# Patient Record
Sex: FEMALE | URBAN - METROPOLITAN AREA
[De-identification: names, ages, dates, MRNs, and addresses within clinical notes are randomized per-mention and may not be internally consistent; named-entity substitution may affect disease eponyms.]

---

## 2019-04-22 ENCOUNTER — OFFICE VISIT (OUTPATIENT)
Dept: ORTHOPEDIC SURGERY | Facility: CLINIC | Age: 39
End: 2019-04-22

## 2019-04-22 VITALS
HEART RATE: 83 BPM | DIASTOLIC BLOOD PRESSURE: 87 MMHG | HEIGHT: 67 IN | RESPIRATION RATE: 18 BRPM | TEMPERATURE: 98.2 F | BODY MASS INDEX: 37.42 KG/M2 | SYSTOLIC BLOOD PRESSURE: 131 MMHG | OXYGEN SATURATION: 99 % | WEIGHT: 238.4 LBS

## 2019-04-22 DIAGNOSIS — M75.02 ADHESIVE CAPSULITIS OF LEFT SHOULDER: ICD-10-CM

## 2019-04-22 DIAGNOSIS — M75.32 CALCIFIC TENDINITIS OF LEFT SHOULDER: ICD-10-CM

## 2019-04-22 DIAGNOSIS — S43.402A SPRAIN OF LEFT SHOULDER, UNSPECIFIED SHOULDER SPRAIN TYPE, INITIAL ENCOUNTER: Primary | ICD-10-CM

## 2019-04-22 PROBLEM — E66.01 SEVERE OBESITY (HCC): Status: ACTIVE | Noted: 2019-04-22

## 2019-04-22 RX ORDER — DESOGESTREL/ETHINYL ESTRADIOL AND ETHINYL ESTRADIOL 21-5 (28)
KIT ORAL
Refills: 0 | COMMUNITY
Start: 2019-04-16

## 2019-04-22 RX ORDER — BETAMETHASONE SODIUM PHOSPHATE AND BETAMETHASONE ACETATE 3; 3 MG/ML; MG/ML
6 INJECTION, SUSPENSION INTRA-ARTICULAR; INTRALESIONAL; INTRAMUSCULAR; SOFT TISSUE ONCE
Qty: 0.5 ML | Refills: 0
Start: 2019-04-22 | End: 2019-04-22

## 2019-04-22 NOTE — PATIENT INSTRUCTIONS
Rotator Cuff Injury: Care Instructions Your Care Instructions The rotator cuff is a group of tendons and muscles around the shoulder that keeps the upper arm bone in place. It keeps the shoulder joint stable and allows you to raise and rotate your arm. Damage to the rotator cuff can be caused by overuse, a fall, or a direct blow to the shoulder area, which can tear the tendons. Over time, everyday wear can damage the tendons and make injury more likely. Treatment can depend upon the amount of damage to the tendons. In a younger person, surgery may be the first choice. But if you are older than 25, you likely have some wear on your rotator cuff. Surgery may not be the most effective treatment. Your doctor may have you try physical therapy and exercise first. 
Follow-up care is a key part of your treatment and safety. Be sure to make and go to all appointments, and call your doctor if you are having problems. It's also a good idea to know your test results and keep a list of the medicines you take. How can you care for yourself at home? · Rest your shoulder as much as you can. If your doctor put your arm in a sling or shoulder immobilizer, wear it as directed. Do not take it off before your doctor tells you to. If it is too tight, loosen it. · Be safe with medicines. Read and follow all instructions on the label. ? If the doctor gave you a prescription medicine for pain, take it as prescribed. ? If you are not taking a prescription pain medicine, ask your doctor if you can take an over-the-counter medicine. · Put ice or a cold pack on your shoulder for 10 to 20 minutes at a time. Try to do this every 1 to 2 hours for the next 3 days (when you are awake). Put a thin cloth between the ice pack and your skin. · After 3 days, put a warm, wet towel on your shoulder. This is to relax the muscles and help blood flow.  
· While holding a warm, wet towel on your shoulder, lean forward so your arm hangs freely, and gently swing your arm back and forth like a pendulum. You also can do this standing under a warm shower. · Do not do anything that makes your pain worse. · Follow your doctor's advice about whether you need physical therapy. When should you call for help? Call your doctor now or seek immediate medical care if: 
  · You have severe pain.  
  · You cannot move your shoulder or arm.  
  · You have tingling or numbness in your arm or hand.  
  · Your arm or hand is cool or pale.  
 Watch closely for changes in your health, and be sure to contact your doctor if: 
  · Your pain gets worse.  
  · You have new or worse swelling in your arm or hand.  
  · You do not get better as expected. Where can you learn more? Go to http://shiva-luana.info/. Enter 473 [id number] in the search box to learn more about \"Rotator Cuff Injury: Care Instructions. \" Current as of: September 20, 2018 Content Version: 11.9 © 1719-0876 [employer]. Care instructions adapted under license by [employer] (which disclaims liability or warranty for this information). If you have questions about a medical condition or this instruction, always ask your healthcare professional. [place] [address] any warranty or liability for your use of this information. Shoulder Sprain: Care Instructions Your Care Instructions A shoulder sprain occurs when you stretch or tear a ligament in your shoulder. Ligaments are tough tissues that connect one bone to another. A sprain can happen during sports, a fall, or projects around the house. Shoulder sprains usually get better with treatment at home. Follow-up care is a key part of your treatment and safety. Be sure to make and go to all appointments, and call your doctor if you are having problems. It's also a good idea to know your test results and keep a list of the medicines you take. How can you care for yourself at home? · Rest and protect your shoulder. Try to stop or reduce any action that causes pain. · If your doctor gave you a sling or immobilizer, wear it as directed. A sling or immobilizer supports your shoulder and may make you more comfortable. · Put ice or a cold pack on your shoulder for 10 to 20 minutes at a time. Try to do this every 1 to 2 hours for the next 3 days (when you are awake) or until the swelling goes down. Put a thin cloth between the ice and your skin. Some doctors suggest alternating between hot and cold. · Be safe with medicines. Read and follow all instructions on the label. ? If the doctor gave you a prescription medicine for pain, take it as prescribed. ? If you are not taking a prescription pain medicine, ask your doctor if you can take an over-the-counter medicine. · For the first day or two after an injury, avoid things that might increase swelling, such as hot showers, hot tubs, or hot packs. · After 2 or 3 days, if your swelling is gone, apply a heating pad set on low or a warm cloth to your shoulder. This helps keep your shoulder flexible. Some doctors suggest that you go back and forth between hot and cold. Put a thin cloth between the heating pad and your skin. · Follow your doctor's or physical therapist's directions for exercises. · Return to your usual level of activity slowly. When should you call for help? Call your doctor now or seek immediate medical care if: 
  · Your pain is worse.  
  · You cannot move your shoulder.  
  · Your arm is cool or pale or changes color below the shoulder.  
  · You have tingling, weakness, or numbness in your arm.  
 Watch closely for changes in your health, and be sure to contact your doctor if: 
  · You do not get better as expected. Where can you learn more? Go to http://shiva-luana.info/. Enter F614 in the search box to learn more about \"Shoulder Sprain: Care Instructions. \" Current as of: September 20, 2018 Content Version: 11.9 © 9694-0604 [employer]. Care instructions adapted under license by [employer] (which disclaims liability or warranty for this information). If you have questions about a medical condition or this instruction, always ask your healthcare professional. [address] any warranty or liability for your use of this information. Frozen Shoulder: Care Instructions Your Care Instructions Frozen shoulder is stiffness, pain, and trouble moving your shoulder. It may happen after an injury or overuse, or from a disease such as diabetes or a stroke. You may have pain that keeps you from using your shoulder. However, you need to move your shoulder. If you do not move it, it will get more stiff and sore. Your doctor may order an X-ray to make sure there is not another cause for your stiff shoulder. You can treat frozen shoulder with heat, stretching, over-the-counter pain medicines, and physical therapy. Your doctor also may inject medicine into your shoulder to reduce pain and swelling. It can take a year or more to get better. Surgery is almost never needed. Follow-up care is a key part of your treatment and safety. Be sure to make and go to all appointments, and call your doctor if you are having problems. It's also a good idea to know your test results and keep a list of the medicines you take. How can you care for yourself at home? · Take pain medicines exactly as directed. ? If the doctor gave you a prescription medicine for pain, take it as prescribed. ? If you are not taking a prescription pain medicine, ask your doctor if you can take an over-the-counter medicine. · Put a heating pad set on low or a warm, wet towel wrapped in plastic on your shoulder. The heat may make it easier to stretch your shoulder. · Follow your doctor's advice for stretches and exercises. · Go to physical therapy if your doctor suggests it. · Try these stretching exercises to reduce stiffness if your doctor says it is okay. Do the exercises slowly to avoid injury. Put a warm, wet towel on your shoulder before exercising. Stop any exercise that increases pain. ? Pendulum exercise. While leaning forward and holding onto a table or the back of a chair with your good arm, bend at the waist, allowing your affected arm to hang straight down. Swing the affected arm back and forth like a pendulum, then in circles that start small and gradually grow larger as pain allows. Try this for about 2 or 3 minutes, several times a day. Once pain begins to go away, you can do this exercise while holding a 1- or 2-pound weight. ? Wall climbing (to the side). Stand with your side to a wall so that your fingers can just touch it. Then turn so your body is turned slightly toward the wall. Walk the fingers of your injured arm up the wall as high as pain permits. Hold that position for a count of 15 to 30 seconds. Walk your fingers down to the starting position. Repeat 2 to 4 times, trying to reach higher each time. ? Wall climbing (to the front). Face a wall, standing so your fingers can just touch it. Walk the fingers of your affected arm up the wall as high as pain permits. Hold that position for a count of 15 to 30 seconds. Slowly walk your fingers to the starting position. Repeat 2 to 4 times, trying to reach higher each time. When should you call for help? Call your doctor now or seek immediate medical care if: 
  · You have severe pain.  
  · Your arm is cool or pale or changes color.  
  · You have tingling or numbness in your arm.  
 Watch closely for changes in your health, and be sure to contact your doctor if: 
  · You have increased pain.  
  · You have new pain that develops in another area. For example, you have pain in your arm, hand, or elbow.  
  · You do not get better as expected. Where can you learn more? Go to http://shiva-luana.info/. Enter R621 in the search box to learn more about \"Frozen Shoulder: Care Instructions. \" Current as of: September 20, 2018 Content Version: 11.9 © 6166-6187 [employer]. Care instructions adapted under license by [employer] (which disclaims liability or warranty for this information). If you have questions about a medical condition or this instruction, always ask your healthcare professional. [address] any warranty or liability for your use of this information. Frozen Shoulder: Exercises Your Care Instructions Here are some examples of typical rehabilitation exercises for your condition. Start each exercise slowly. Ease off the exercise if you start to have pain. Your doctor or physical therapist will tell you when you can start these exercises and which ones will work best for you. How to do the exercises Neck stretches 1. Look straight ahead, and tip your right ear to your right shoulder. Do not let your left shoulder rise up as you tip your head to the right. 2. Hold 15 to 30 seconds. 3. Tilt your head to the left. Do not let your right shoulder rise up as you tip your head to the left. 4. Hold for 15 to 30 seconds. 5. Repeat 2 to 4 times to each side. Shoulder rolls 1. Sit comfortably with your feet shoulder-width apart. You can also do this exercise while standing. 2. Roll your shoulders up, then back, and then down in a smooth, circular motion. 3. Repeat 2 to 4 times. Shoulder flexion (lying down) 1. Lie on your back, holding a wand with your hands. Your palms should face down as you hold the wand. Place your hands slightly wider than your shoulders. 2. Keeping your elbows straight, slowly raise your arms over your head until you feel a stretch in your shoulders, upper back, and chest. 
3. Hold 15 to 30 seconds. 4. Repeat 2 to 4 times. Shoulder rotation (lying down) 1. Lie on your back and hold a wand in both hands with your elbows bent and your palms up. 2. Keeping your elbows close to your body, move the wand across your body toward the arm that has pain. 3. Hold for 15 to 30 seconds. 4. Repeat 2 to 4 times. Shoulder internal rotation with towel 1. Roll up a towel lengthwise. Hold the towel above and behind your head with the arm that is not sore. 2. With your sore arm, reach behind your back and grasp the towel. 3. Using the arm above your head, pull the towel upward until you feel a stretch on the front and outside of your sore shoulder. 4. Hold 15 to 30 seconds. 5. Relax and move the towel back down to the starting position. 6. Repeat 2 to 4 times. Shoulder blade squeeze 1. While standing with your arms at your sides, squeeze your shoulder blades together. Do not raise your shoulders up as you are squeezing. 2. Hold for 6 seconds. 3. Repeat 8 to 12 times. Follow-up care is a key part of your treatment and safety. Be sure to make and go to all appointments, and call your doctor if you are having problems. It's also a good idea to know your test results and keep a list of the medicines you take. Where can you learn more? Go to http://shiva-luana.info/. Enter R144 in the search box to learn more about \"Frozen Shoulder: Exercises. \" Current as of: September 20, 2018 Content Version: 11.9 © 8942-1822 [employer]. Care instructions adapted under license by [employer] (which disclaims liability or warranty for this information). If you have questions about a medical condition or this instruction, always ask your healthcare professional. [address] any warranty or liability for your use of this information.

## 2019-04-22 NOTE — LETTER
4/22/2019 11:43 AM 
 
Ms. Vanderbilt Dance [address] [facility] [address] [place] [address] THE ABOVE PATIENT WAS SEEN TODAY.  
 
 
 
Sincerely, 
 
 
Angie Cordoba MD

## 2019-04-22 NOTE — PROGRESS NOTES
Patient: Rosemarie Goncalves                MRN: [id number]       SSN: [id number] YOB: 1980        AGE: 35 y.o. SEX: female PCP: No primary care provider on file. 04/22/19 Chief Complaint Patient presents with  Shoulder Pain Left HISTORY:  Rosemarie Goncalves is 35 y.o. female who is seen for left shoulder pain. She has been experiencing increasing shoulder stiffness and pain for the past 3 days. She fell on some steps recently. She caught herself on the hand rail while falling. She felt a sudden pain in her left shoulder  She was seen by her chiropractor and then by [facility] on 4/21/19. Xray at [facility] was negative for fracture but revealed calcific tendinitis. She has been experiencing significant shoulder pain following her fall. She notes shoulder pain with overhead activities and at night. She is right handed. She can barely raise her shoulder to 90 degrees. Pain Assessment  4/22/2019 Location of Pain Shoulder Location Modifiers Left Severity of Pain 0 Quality of Pain Aching Duration of Pain Persistent Frequency of Pain Intermittent Aggravating Factors Bending;Stretching;Straightening Limiting Behavior Yes Relieving Factors Rest;Ice;Heat Result of Injury Yes Work-Related Injury No  
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 She lives alone in [place]. Her mother is in the area. Ms. Prateek Laurent weighs 238 lbs and is 5'7\" tall. No results found for: HBA1C, HGBE8, [id number], ZAA2TWVL, MNL5IRNE Weight Metrics 4/22/2019 Weight 238 lb 6.4 oz BMI 37.34 kg/m2 There is no problem list on file for this patient. REVIEW OF SYSTEMS: All Below are Negative except: See HPI Constitutional: negative for fever, chills, and weight loss. Cardiovascular: negative for chest pain, claudication, leg swelling, SOB, HUGO Gastrointestinal: Negative for pain, N/V/C/D, Blood in stool or urine, dysuria, hematuria, incontinence, pelvic pain. Musculoskeletal: See HPI Neurological: Negative for dizziness and weakness. Negative for headaches, Visual changes, confusion, seizures Phychiatric/Behavioral: Negative for depression, memory loss, substance abuse. Extremities: Negative for hair changes, rash, or skin lesion changes. Hematologic: Negative for bleeding problems, bruising, pallor or swollen lymph nodes Peripheral Vascular: No calf pain, no circulation deficits. Social History Socioeconomic History  Marital status: SINGLE Spouse name: Not on file  Number of children: Not on file  Years of education: Not on file  Highest education level: Not on file Occupational History  Not on file Social Needs  Financial resource strain: Not on file  Food insecurity:  
  Worry: Not on file Inability: Not on file  Transportation needs:  
  Medical: Not on file Non-medical: Not on file Tobacco Use  Smoking status: Never Smoker  Smokeless tobacco: Never Used Substance and Sexual Activity  Alcohol use: Yes Comment: socially  Drug use: Never  Sexual activity: Not on file Lifestyle  Physical activity:  
  Days per week: Not on file Minutes per session: Not on file  Stress: Not on file Relationships  Social connections:  
  Talks on phone: Not on file Gets together: Not on file Attends Yarsani service: Not on file Active member of club or organization: Not on file Attends meetings of clubs or organizations: Not on file Relationship status: Not on file  Intimate partner violence:  
  Fear of current or ex partner: Not on file Emotionally abused: Not on file Physically abused: Not on file Forced sexual activity: Not on file Other Topics Concern  Not on file Social History Narrative  Not on file No Known Allergies Current Outpatient Medications Medication Sig  PIMTREA, 28, 0.15-0.02 mgx21 /0.01 mg x 5 tab take 1 tablet by mouth once daily No current facility-administered medications for this visit. PHYSICAL EXAMINATION: 
Visit Vitals /87 Pulse 83 Temp 98.2 °F (36.8 °C) (Oral) Resp 18 Ht 5' 7\" (1.702 m) Wt 238 lb 6.4 oz (108.1 kg) SpO2 99% BMI 37.34 kg/m² ORTHO EXAMINATION: 
Examination Right shoulder Left shoulder Skin Intact Intact Effusion - - Biceps deformity - - Atrophy - -  
AC joint tenderness - - Acromial tenderness - ++ Biceps tenderness - - Forward flexion/Elevation  165 pain beyond 90 Active abduction  165 External rotation ROM 30 10 Internal rotation ROM 90 85 Apprehension - - Impingement - - Drop Arm Test - - Neurovascular Intact Intact Pain with returning arm to side TIME OUT: 
Chart reviewed for the following: 
 Jamey Kanner, MD, have reviewed the History, Physical and updated the Allergic reactions for [facility] TIME OUT performed immediately prior to start of procedure: 
Jamey Kanner, MD, have performed the following reviews on [facility] prior to the start of the procedure:         
* Patient was identified by name and date of birth * Agreement on procedure being performed was verified * Risks and Benefits explained to the patient * Procedure site verified and marked as necessary * Patient was positioned for comfort * Consent was obtained Time: 11:37 AM  
 
Date of procedure: 4/22/2019 Procedure performed by:  Ender Jackson MD 
Ms. Joe Bowen tolerated the procedure well with no complications. RADIOGRAPHS: 
XR LEFT SHOULDER 4/21/19 [facility] 
Impression: calfiic tendinitis alone humeral head. Mild AC joint narrowing. No acute abnormality. -I have independently reviewed these images during this office visit. -Dr. Emmy Abraham IMPRESSION:  Three views - No fractures, mild acromioclavicular narrowing, mild glenohumeral narrowing, calcific tendinitis densities. IMPRESSION:   
  ICD-10-CM ICD-9-CM 1. Sprain of left shoulder, unspecified shoulder sprain type, initial encounter S43.402A 840.9 betamethasone (CELESTONE SOLUSPAN) 6 mg/mL injection BETAMETHASONE ACETATE & SODIUM PHOSPHATE INJECTION 3 MG EA.  
   DRAIN/INJECT LARGE JOINT/BURSA REFERRAL TO PHYSICAL THERAPY 2. Calcific tendinitis of left shoulder M75.32 726.11 betamethasone (CELESTONE SOLUSPAN) 6 mg/mL injection BETAMETHASONE ACETATE & SODIUM PHOSPHATE INJECTION 3 MG EA.  
   DRAIN/INJECT LARGE JOINT/BURSA REFERRAL TO PHYSICAL THERAPY 3. Adhesive capsulitis of left shoulder M75.02 726.0 betamethasone (CELESTONE SOLUSPAN) 6 mg/mL injection BETAMETHASONE ACETATE & SODIUM PHOSPHATE INJECTION 3 MG EA.  
   DRAIN/INJECT LARGE JOINT/BURSA REFERRAL TO PHYSICAL THERAPY PLAN:  After discussing treatment options, patient's left shoulder was injected with 4 cc Marcaine and 1/2 cc Celestone. She will follow up as needed. She was provided a note for work stating she was seen tomorrow. She will start a brief course of outpatient physical therapy. We discussed a possible need for left shoulder MRI in the future if pain continues.    
 
Scribed by Emmy Benson ([address]) as dictated by Saundra Belle MD

## 2019-04-25 ENCOUNTER — APPOINTMENT (OUTPATIENT)
Dept: PHYSICAL THERAPY | Age: 39
End: 2019-04-25
Payer: COMMERCIAL

## 2019-04-25 ENCOUNTER — HOSPITAL ENCOUNTER (OUTPATIENT)
Dept: PHYSICAL THERAPY | Age: 39
Discharge: HOME | End: 2019-04-25
Payer: COMMERCIAL

## 2019-04-25 PROCEDURE — 97535 SELF CARE MNGMENT TRAINING: CPT

## 2019-04-25 PROCEDURE — 97161 PT EVAL LOW COMPLEX 20 MIN: CPT

## 2019-04-25 PROCEDURE — 97110 THERAPEUTIC EXERCISES: CPT

## 2019-04-25 NOTE — PROGRESS NOTES
[address]  [address] [place], [address] 
[phone] fax Plan of Care/ Statement of Necessity for Physical Therapy Services Patient name: Melissa Ryan Start of Care: 2019 Referral source: Peterson Hardwick MD : 1980 Medical Diagnosis: Calcific tendinitis of left shoulder [M75.32] Payor: Tavoraymond Kaiden / [facility] / Product Type: HMO /  Onset Date:2019 Treatment Diagnosis: Left shoulder sprain Prior Hospitalization: see medical history Provider#: [id number] Medications: Verified on Patient summary List  
 Comorbidities: none reported Prior Level of Function: Pt RHD female able to perform ADLs and household duties without shoulder pain The Plan of Care and following information is based on the information from the initial evaluation. Assessment/ key information: Pt is a 34 y/o F presenting with c/o left shoulder pain following fall on . Pt reports she missed a step descending stairs and grabbed railing with her left hand to avoid falling. Possible hyperabduction injury per pt description. She did receive an injection on  which has improved her mobility and overall pain. She complains of stiffness into end range flexion, abduction and ER. PROM good at this time without pain reports. She does have TTP through left UT region and over posterior acromion. Strength WNL at this time through B shoulders. Pt signs and symptoms appear consistent with left shoulder sprain, she would benefit from PT to improve ROM, flexibility and posterior shoulder strength.  
 
Evaluation Complexity History LOW Complexity : Zero comorbidities / personal factors that will impact the outcome / POC; Examination LOW Complexity : 1-2 Standardized tests and measures addressing body structure, function, activity limitation and / or participation in recreation  ;Presentation LOW Complexity : Stable, uncomplicated ;Clinical Decision Making MEDIUM Complexity : FOTO score of 26-74 Overall Complexity Rating: LOW Problem List: pain affecting function, decrease ROM, decrease strength, decrease ADL/ functional abilitiies, decrease activity tolerance and decrease flexibility/ joint mobility Treatment Plan may include any combination of the following: Therapeutic exercise, Therapeutic activities, Neuromuscular re-education, Physical agent/modality, Manual therapy, Patient education, Self Care training and Functional mobility training Patient / Family readiness to learn indicated by: asking questions and trying to perform skills Persons(s) to be included in education: patient (P) Barriers to Learning/Limitations: None Patient Goal (s): Not be so tight Patient Self Reported Health Status: excellent Rehabilitation Potential: good Short Term Goals: To be accomplished in 2 weeks: 1. Pt will be I and compliant with HEP to maximize therapeutic benefit. 2. Pt will improve left shoulder flexion AROM to 155 deg without pain for improved ease of ADLs. Long Term Goals: To be accomplished in 4 weeks: 1. Pt will demonstrate left shoulder behind the back ER and IR without pain/tension to improve ease of self care. 2. Pt will demonstrate 4+/5 MT and LT strength to improve posterior shoulder stability with OH activities. 3. Pt will report no difficulty reaching a shelf at shoulder height to improve quality of life and function. Frequency / Duration: Patient to be seen 2 times per week for 4 weeks. Patient/ Caregiver education and instruction: Diagnosis, prognosis, self care, activity modification and exercises 
 [x]  Plan of care has been reviewed with NICO Alanis DPT, CMTPT 4/25/2019 11:02 AM 
 
________________________________________________________________________ I certify that the above Therapy Services are being furnished while the patient is under my care. I agree with the treatment plan and certify that this therapy is necessary. [de-identified] Signature:____________Date:_________TIME:________ 
 
[employer], Date and Time must be completed for valid certification ** Please sign and return to [address] [place], [address] 
[phone] fax

## 2019-04-25 NOTE — PROGRESS NOTES
PT DAILY TREATMENT NOTE  Patient Name: Janel Grove Date:2019 : 1980 [x]  Patient  Verified Payor: Jevon Fransisco / Plan: [employer] HMO / Product Type: HMO /   
In time:9:47  Out time:10:28 Total Treatment Time (min): 41 Visit #: 1 of 8 Treatment Area: Calcific tendinitis of left shoulder [M75.32] SUBJECTIVE Pain Level (0-10 scale): 0 at rest 
Any medication changes, allergies to medications, adverse drug reactions, diagnosis change, or new procedure performed?: [x] No    [] Yes (see summary sheet for update) Subjective functional status/changes:   [] No changes reported Pt reports tightness and \"resistance\" with end ranges of shoulder elevation OBJECTIVE Modality rationale: PD to improve the patients ability to Min Type Additional Details  
 [] Estim:  []Unatt       []IFC  []Premod []Other:  []w/ice   []w/heat Position: Location:  
 [] Estim: []Att    []TENS instruct  []NMES []Other:  []w/US   []w/ice   []w/heat Position: Location:  
 []  Traction: [] Cervical       []Lumbar 
                     [] Prone          []Supine []Intermittent   []Continuous Lbs: 
[] before manual 
[] after manual  
 []  Ultrasound: []Continuous   [] Pulsed []1MHz   []3MHz W/cm2: 
Location:  
 []  Iontophoresis with dexamethasone Location: [] Take home patch  
[] In clinic  
 []  Ice     []  heat 
[]  Ice massage 
[]  Laser  
[]  Anodyne Position: Location:  
 []  Laser with stim 
[]  Other:  Position: Location:  
 []  Vasopneumatic Device Pressure:       [] lo [] med [] hi  
Temperature: [] lo [] med [] hi  
[] Skin assessment post-treatment:  []intact []redness- no adverse reaction 
  []redness  adverse reaction:  
 
10 min [x]Eval                  []Re-Eval  
 
 
8 min Therapeutic Exercise:  [] See flow sheet :  
Rationale: increase ROM and increase strength to improve the patients ability to perform daily tasks and self care 23 min Therapeutic Activity:  []  See flow sheet :  
Rationale: self care and pt education  to improve the patients ability to self manage symptoms and progress with PT With 
 [] TE 
 [] TA 
 [] neuro 
 [] other: Patient Education: [x] Review HEP [] Progressed/Changed HEP based on:  
[] positioning   [] body mechanics   [] transfers   [] heat/ice application   
[] other:   
 
Other Objective/Functional Measures: see initial eval  
 
Pain Level (0-10 scale) post treatment: 0 at rest 
 
ASSESSMENT/Changes in Function: see POC Patient will continue to benefit from skilled PT services to modify and progress therapeutic interventions, address functional mobility deficits, address ROM deficits, address strength deficits, analyze and address soft tissue restrictions, analyze and cue movement patterns and analyze and modify body mechanics/ergonomics to attain remaining goals. [x]  See Plan of Care 
[]  See progress note/recertification 
[]  See Discharge Summary Progress towards goals / Updated goals: 
Short Term Goals: To be accomplished in 2 weeks: 1. Pt will be I and compliant with HEP to maximize therapeutic benefit. 2. Pt will improve left shoulder flexion AROM to 155 deg without pain for improved ease of ADLs. Long Term Goals: To be accomplished in 4 weeks: 1. Pt will demonstrate left shoulder behind the back ER and IR without pain/tension to improve ease of self care. 2. Pt will demonstrate 4+/5 MT and LT strength to improve posterior shoulder stability with OH activities. 3. Pt will report no difficulty reaching a shelf at shoulder height to improve quality of life and function. PLAN 
[]  Upgrade activities as tolerated     [x]  Continue plan of care 
[]  Update interventions per flow sheet      
[]  Discharge due to:_ 
[]  Other:_ Migdalia Rodriguez DPT, CMTPT 4/25/2019  12:56 PM 
 
Future Appointments Date Time Provider Aida Birch 5/7/2019  5:30 PM Mily Merino, NICO [facility]  
5/10/2019  4:30 PM Nia Tierney PTA [facility]  
5/20/2019  5:00 PM iMly Merino PTA [facility]  
5/24/2019  4:30 PM Paola Powers [facility]  
5/28/2019  6:00 PM Ramos [id number] [facility]  
5/30/2019  4:30 PM Paola Powers [facility]

## 2019-05-07 ENCOUNTER — APPOINTMENT (OUTPATIENT)
Dept: PHYSICAL THERAPY | Age: 39
End: 2019-05-07
Payer: COMMERCIAL

## 2019-05-10 ENCOUNTER — APPOINTMENT (OUTPATIENT)
Dept: PHYSICAL THERAPY | Age: 39
End: 2019-05-10
Payer: COMMERCIAL

## 2019-05-17 ENCOUNTER — APPOINTMENT (OUTPATIENT)
Dept: PHYSICAL THERAPY | Age: 39
End: 2019-05-17
Payer: COMMERCIAL

## 2019-05-20 ENCOUNTER — APPOINTMENT (OUTPATIENT)
Dept: PHYSICAL THERAPY | Age: 39
End: 2019-05-20
Payer: COMMERCIAL

## 2019-05-24 ENCOUNTER — HOSPITAL ENCOUNTER (OUTPATIENT)
Dept: PHYSICAL THERAPY | Age: 39
Discharge: HOME | End: 2019-05-24
Payer: COMMERCIAL

## 2019-05-24 PROCEDURE — 97110 THERAPEUTIC EXERCISES: CPT

## 2019-05-24 PROCEDURE — 97164 PT RE-EVAL EST PLAN CARE: CPT

## 2019-05-24 NOTE — PROGRESS NOTES
PT DAILY TREATMENT NOTE     Patient Name: Duane Smoker  Date:2019  : 1980  [x]  Patient  Verified  Payor: Avery Olszewski / Plan: [employer] HMO / Product Type: HMO /    In time:1015  Out time: 1035  Total Treatment Time (min): 20  Visit #: 2 of 8    Treatment Area: Calcific tendinitis of left shoulder [M75.32]    SUBJECTIVE  Pain Level (0-10 scale): 0   Any medication changes, allergies to medications, adverse drug reactions, diagnosis change, or new procedure performed?: [x] No    [] Yes (see summary sheet for update)  Subjective functional status/changes:   [] No changes reported  Pt reports tightness in B UT but no left shoulder pain. She reports she has not been able to attend PT due to work schedule and is unsure if she could come to PT consistently.      OBJECTIVE    Modality rationale: PD to improve the patients ability to    Min Type Additional Details    [] Estim:  []Unatt       []IFC  []Premod                        []Other:  []w/ice   []w/heat  Position:  Location:    [] Estim: []Att    []TENS instruct  []NMES                    []Other:  []w/US   []w/ice   []w/heat  Position:  Location:    []  Traction: [] Cervical       []Lumbar                       [] Prone          []Supine                       []Intermittent   []Continuous Lbs:  [] before manual  [] after manual    []  Ultrasound: []Continuous   [] Pulsed                           []1MHz   []3MHz W/cm2:  Location:    []  Iontophoresis with dexamethasone         Location: [] Take home patch   [] In clinic    []  Ice     []  heat  []  Ice massage  []  Laser   []  Anodyne Position:  Location:    []  Laser with stim  []  Other:  Position:  Location:    []  Vasopneumatic Device Pressure:       [] lo [] med [] hi   Temperature: [] lo [] med [] hi   [] Skin assessment post-treatment:  []intact []redness- no adverse reaction    []redness  adverse reaction:     12 min []Eval                  [x]Re-Eval       8 min Therapeutic Exercise: [] See flow sheet : updated HEP   Rationale: increase ROM and increase strength to improve the patients ability to perform daily tasks and self care             With   [] TE   [] TA   [] neuro   [] other: Patient Education: [x] Review HEP    [] Progressed/Changed HEP based on:   [] positioning   [] body mechanics   [] transfers   [] heat/ice application    [] other:      Other Objective/Functional Measures: left shoulder AROM is WNL in all planes of motion without c/o pain. MMT of the left shoulder is grossly 4+/5 to 5/5 without complaint and is equal to her right shoulder. FOTO score is 100%, indicating no functional limitations. Impingement tests and full can test are all negative. Pt was given updated HEP and discharged from PT. Pain Level (0-10 scale) post treatment: 0     ASSESSMENT/Changes in Function: see discharge       []  See Plan of Care  []  See progress note/recertification  [x]  See Discharge Summary         Progress towards goals :  Pt has met all of the following goals. Short Term Goals: To be accomplished in 2 weeks:  1. Pt will be I and compliant with HEP to maximize therapeutic benefit. 2. Pt will improve left shoulder flexion AROM to 155 deg without pain for improved ease of ADLs. Long Term Goals: To be accomplished in 4 weeks:  1. Pt will demonstrate left shoulder behind the back ER and IR without pain/tension to improve ease of self care. 2. Pt will demonstrate 4+/5 MT and LT strength to improve posterior shoulder stability with OH activities. 3. Pt will report no difficulty reaching a shelf at shoulder height to improve quality of life and function. PLAN  []  Upgrade activities as tolerated     []  Continue plan of care  []  Update interventions per flow sheet       [x]  Discharge due to:_  []  Other:_      Rosa Elena Cazares, PT  5/24/2019  12:49 PM    No future appointments.

## 2019-05-24 NOTE — PROGRESS NOTES
[facility]  [address]  [place], [address]  [phone] fax    Physical Therapy Discharge Summary  Patient name: Junior Escudero Start of Care: 2019   Referral source: Radha Hicks MD : 1980                Medical Diagnosis: Calcific tendinitis of left shoulder [M75.32]  Payor: Betsey Bertrand / Plan: [employer] HMO / Product Type: HMO /  Onset Date:2019                Treatment Diagnosis: Left shoulder sprain   Prior Hospitalization: see medical history Provider#: [id number]   Medications: Verified on Patient summary List    Comorbidities: none reported   Prior Level of Function: Pt RHD female able to perform ADLs and household duties without shoulder pain        Visits from Start of Care: 2    Missed Visits: 7  Reporting Period : 19 to 19      Summary of Care: Pt has had poor attendance due to her job. Her left shoulder AROM is WNL in all planes of motion without c/o pain. MMT of the left shoulder is grossly 4+/5 to 5/5 without complaint and is equal to her right shoulder. FOTO score is 100%, indicating no functional limitations. Impingement tests and full can test are all negative. Pt was given updated HEP and discharged from PT. Progress towards goals :  Pt has met all of the following goals. Short Term Goals: To be accomplished in 2 weeks:  1. Pt will be I and compliant with HEP to maximize therapeutic benefit. 2. Pt will improve left shoulder flexion AROM to 155 deg without pain for improved ease of ADLs. Long Term Goals: To be accomplished in 4 weeks:  1. Pt will demonstrate left shoulder behind the back ER and IR without pain/tension to improve ease of self care. 2. Pt will demonstrate 4+/5 MT and LT strength to improve posterior shoulder stability with OH activities.   3. Pt will report no difficulty reaching a shelf at shoulder height to improve quality of life and    ASSESSMENT/RECOMMENDATIONS:  [x]Discontinue therapy: [x]Patient has reached or is progressing toward set goals      []Patient is non-compliant or has abdicated      []Due to lack of appreciable progress towards set Fredo 70, PT 5/24/2019 12:57 PM

## 2019-05-28 ENCOUNTER — APPOINTMENT (OUTPATIENT)
Dept: PHYSICAL THERAPY | Age: 39
End: 2019-05-28
Payer: COMMERCIAL

## 2019-05-30 ENCOUNTER — APPOINTMENT (OUTPATIENT)
Dept: PHYSICAL THERAPY | Age: 39
End: 2019-05-30
Payer: COMMERCIAL